# Patient Record
Sex: MALE | Race: WHITE | HISPANIC OR LATINO | Employment: FULL TIME | URBAN - METROPOLITAN AREA
[De-identification: names, ages, dates, MRNs, and addresses within clinical notes are randomized per-mention and may not be internally consistent; named-entity substitution may affect disease eponyms.]

---

## 2021-07-02 ENCOUNTER — OFFICE VISIT (OUTPATIENT)
Dept: FAMILY MEDICINE CLINIC | Facility: CLINIC | Age: 26
End: 2021-07-02

## 2021-07-02 VITALS
BODY MASS INDEX: 29.83 KG/M2 | HEIGHT: 69 IN | SYSTOLIC BLOOD PRESSURE: 162 MMHG | TEMPERATURE: 98.7 F | RESPIRATION RATE: 16 BRPM | HEART RATE: 91 BPM | DIASTOLIC BLOOD PRESSURE: 110 MMHG | WEIGHT: 201.4 LBS | OXYGEN SATURATION: 99 %

## 2021-07-02 DIAGNOSIS — K21.9 GASTROESOPHAGEAL REFLUX DISEASE WITHOUT ESOPHAGITIS: ICD-10-CM

## 2021-07-02 DIAGNOSIS — F41.9 ANXIETY: ICD-10-CM

## 2021-07-02 DIAGNOSIS — Z00.00 ANNUAL PHYSICAL EXAM: Primary | ICD-10-CM

## 2021-07-02 PROCEDURE — 86803 HEPATITIS C AB TEST: CPT | Performed by: FAMILY MEDICINE

## 2021-07-02 PROCEDURE — G0432 EIA HIV-1/HIV-2 SCREEN: HCPCS | Performed by: FAMILY MEDICINE

## 2021-07-02 PROCEDURE — 99385 PREV VISIT NEW AGE 18-39: CPT | Performed by: FAMILY MEDICINE

## 2021-07-02 PROCEDURE — 80053 COMPREHEN METABOLIC PANEL: CPT | Performed by: FAMILY MEDICINE

## 2021-07-02 PROCEDURE — 99213 OFFICE O/P EST LOW 20 MIN: CPT | Performed by: FAMILY MEDICINE

## 2021-07-02 PROCEDURE — 83036 HEMOGLOBIN GLYCOSYLATED A1C: CPT | Performed by: FAMILY MEDICINE

## 2021-07-02 PROCEDURE — 84443 ASSAY THYROID STIM HORMONE: CPT | Performed by: FAMILY MEDICINE

## 2021-07-02 PROCEDURE — 80061 LIPID PANEL: CPT | Performed by: FAMILY MEDICINE

## 2021-07-02 NOTE — PROGRESS NOTES
New Patient Office Visit      Patient Name: Tavo Tomlinson  : 1995   MRN: [id number]     Chief Complaint:    Chief Complaint   Patient presents with   • Establish Care   • Annual Exam       History of Present Illness: Tavo Tomlinson is a 25 y.o. male who is here today to establish care.  Patient presents to establish care and needs annual exam.  Patient is also has complaint of GERD anxiety and he has hypertension today.    Anxiety - he has anxiety related to work, marriage and kids. This may worsened bp.  Patient says overall his anxiety does not affect his life.  Patient says it may affect his blood pressure but he deals with it fairly well.      Elevated BP-patient denies chest pain or shortness of breath but does have reflux symptoms.  Patient noted previously elevated blood pressure at an eye doctor's appointment.  Patient denied having ocular changes related of blood pressure.  Patient says his eye doctor did not mention this.    gerd - he went to ent for gerd and this helps his sx. He says he can still have symptoms. He uses tums. He says it is controlled some.  Patient does not want to take an everyday medication.    Review of systems positive for acid reflux and anxiety    Physical exam: Patient's heart lung exam was normal.  Patient's mood and affect was appropriate.  Patient had no CVA tenderness.  Patient's neurological exam was grossly intact.        Subjective          Past Medical History: History reviewed. No pertinent past medical history.    Past Surgical History: History reviewed. No pertinent surgical history.    Family History:   Family History   Problem Relation Age of Onset   • No Known Problems Mother    • No Known Problems Father    • No Known Problems Brother    • No Known Problems Maternal Grandmother    • Cancer Maternal Grandfather    • Diabetes Paternal Grandmother    • Hyperlipidemia Paternal Grandmother    • Hypertension Paternal Grandmother    • Throat cancer Paternal  "Grandfather    • Stroke Paternal Grandfather    • No Known Problems Son        Social History:   Social History     Socioeconomic History   • Marital status:      Spouse name: Not on file   • Number of children: Not on file   • Years of education: Not on file   • Highest education level: Not on file   Tobacco Use   • Smoking status: Never Smoker   • Smokeless tobacco: Never Used   Substance and Sexual Activity   • Alcohol use: Yes     Comment: socially   • Drug use: Never   • Sexual activity: Yes     Partners: Female     Comment:        Medications:   No current outpatient medications on file.    Allergies:   No Known Allergies    Objective     Physical Exam: Please see HPI for physical exam  Vital Signs:   Vitals:    07/02/21 1332   BP: (!) 162/110   Pulse: 91   Resp: 16   Temp: 98.7 °F (37.1 °C)   TempSrc: Temporal   SpO2: 99%   Weight: 91.4 kg (201 lb 6.4 oz)   Height: 175.3 cm (69\")   PainSc: 0-No pain     Body mass index is 29.74 kg/m².       Assessment / Plan      Assessment/Plan:   Diagnoses and all orders for this visit:    1. Annual physical exam (Primary)  -     Comprehensive Metabolic Panel  -     Hemoglobin A1c  -     TSH Rfx On Abnormal To Free T4  -     Lipid Panel  -     Hepatitis C Antibody  -     HIV-1 / O / 2 Ag / Antibody 4th Generation         Annual counseling: Counseled patient regards to diet exercise and provided him with ample diet information today.  Also counseled him in regards to to screening labs above.  Patient agreed to screening labs.  Patient was educated on vaccine status and he is currently up-to-date except for Covid vaccine.  Patient presents with elevated blood pressure.  He will monitor blood pressure at home and can call in with elevated blood pressures next week.  If elevated I would recommend treating and then following up in 6 weeks for repeat renal function test.  If not responsive to hypertension medications we should work-up for secondary hypertension " given his age and lack of family history.      Follow Up:   No follow-ups on file.    Gordy Tucker DO  [facility] [place]

## 2021-07-03 LAB
ALBUMIN SERPL-MCNC: 4.8 G/DL (ref 3.5–5.2)
ALBUMIN/GLOB SERPL: 1.5 G/DL
ALP SERPL-CCNC: 91 U/L (ref 39–117)
ALT SERPL W P-5'-P-CCNC: 20 U/L (ref 1–41)
ANION GAP SERPL CALCULATED.3IONS-SCNC: 9.9 MMOL/L (ref 5–15)
AST SERPL-CCNC: 24 U/L (ref 1–40)
BILIRUB SERPL-MCNC: 0.5 MG/DL (ref 0–1.2)
BUN SERPL-MCNC: 12 MG/DL (ref 6–20)
BUN/CREAT SERPL: 11.7 (ref 7–25)
CALCIUM SPEC-SCNC: 10 MG/DL (ref 8.6–10.5)
CHLORIDE SERPL-SCNC: 105 MMOL/L (ref 98–107)
CHOLEST SERPL-MCNC: 149 MG/DL (ref 0–200)
CO2 SERPL-SCNC: 26.1 MMOL/L (ref 22–29)
CREAT SERPL-MCNC: 1.03 MG/DL (ref 0.76–1.27)
GFR SERPL CREATININE-BSD FRML MDRD: 89 ML/MIN/1.73
GLOBULIN UR ELPH-MCNC: 3.3 GM/DL
GLUCOSE SERPL-MCNC: 86 MG/DL (ref 65–99)
HBA1C MFR BLD: 4.8 % (ref 4.8–5.6)
HCV AB SER DONR QL: NORMAL
HDLC SERPL-MCNC: 43 MG/DL (ref 40–60)
HIV1+2 AB SER QL: NORMAL
LDLC SERPL CALC-MCNC: 91 MG/DL (ref 0–100)
LDLC/HDLC SERPL: 2.1 {RATIO}
POTASSIUM SERPL-SCNC: 4.7 MMOL/L (ref 3.5–5.2)
PROT SERPL-MCNC: 8.1 G/DL (ref 6–8.5)
SODIUM SERPL-SCNC: 141 MMOL/L (ref 136–145)
TRIGL SERPL-MCNC: 79 MG/DL (ref 0–150)
TSH SERPL DL<=0.05 MIU/L-ACNC: 3.44 UIU/ML (ref 0.27–4.2)
VLDLC SERPL-MCNC: 15 MG/DL (ref 5–40)

## 2021-07-06 ENCOUNTER — TELEPHONE (OUTPATIENT)
Dept: FAMILY MEDICINE CLINIC | Facility: CLINIC | Age: 26
End: 2021-07-06

## 2021-07-06 NOTE — TELEPHONE ENCOUNTER
Caller: Tavo Garcia    Relationship: Self    Best call back number: [phone]    What test was performed: LABS     When was the test performed: 7/2/21    Where was the test performed: IN OFFICE     Additional notes: PATIENT STATED THAT ALL THE MARKERS WERE NORMAL.  IS THERE ANYTHING DR. FERREIRA NEEDS TO GO OVER WITH HIM?

## 2021-07-09 ENCOUNTER — TELEPHONE (OUTPATIENT)
Dept: FAMILY MEDICINE CLINIC | Facility: CLINIC | Age: 26
End: 2021-07-09

## 2021-07-09 NOTE — TELEPHONE ENCOUNTER
Notified pt, pt verbally understood. Pt does not have an upcoming appointment. Pt is not sure if he should have scheduled, at last visit pt stated that you said it would be based off how his BP was reading. Do pt need an follow up appointment, please advise.

## 2021-07-09 NOTE — TELEPHONE ENCOUNTER
PATIENT HAS CALLED WITH HIS LAST 3 BLOOD PRESSURE READINGS PER PCP REQUEST    7/6/21    7:16AM   121/80     10PM   132/79    7/7/21     8:39AM   122/75      9:15 /87    7/8/21     7:50AM   122/84       10:15 PM  126/76      7/9         9 AM  125/85      CALL BACK NUMBER [phone]

## 2021-09-17 DIAGNOSIS — Z30.09 VASECTOMY EVALUATION: Primary | ICD-10-CM

## 2021-11-23 ENCOUNTER — OFFICE VISIT (OUTPATIENT)
Dept: UROLOGY | Facility: CLINIC | Age: 26
End: 2021-11-23

## 2021-11-23 VITALS
BODY MASS INDEX: 27.73 KG/M2 | HEIGHT: 69 IN | DIASTOLIC BLOOD PRESSURE: 76 MMHG | SYSTOLIC BLOOD PRESSURE: 142 MMHG | TEMPERATURE: 97.6 F | OXYGEN SATURATION: 97 % | HEART RATE: 88 BPM | WEIGHT: 187.2 LBS

## 2021-11-23 DIAGNOSIS — Z30.09 VASECTOMY EVALUATION: Primary | ICD-10-CM

## 2021-11-23 LAB
BILIRUB BLD-MCNC: NEGATIVE MG/DL
CLARITY, POC: CLEAR
COLOR UR: YELLOW
EXPIRATION DATE: NORMAL
GLUCOSE UR STRIP-MCNC: NEGATIVE MG/DL
KETONES UR QL: NEGATIVE
LEUKOCYTE EST, POC: NEGATIVE
Lab: NORMAL
NITRITE UR-MCNC: NEGATIVE MG/ML
PH UR: 6 [PH] (ref 5–8)
PROT UR STRIP-MCNC: NEGATIVE MG/DL
RBC # UR STRIP: NEGATIVE /UL
SP GR UR: 1.02 (ref 1–1.03)
UROBILINOGEN UR QL: NORMAL

## 2021-11-23 PROCEDURE — 99203 OFFICE O/P NEW LOW 30 MIN: CPT | Performed by: STUDENT IN AN ORGANIZED HEALTH CARE EDUCATION/TRAINING PROGRAM

## 2021-11-23 PROCEDURE — 81003 URINALYSIS AUTO W/O SCOPE: CPT | Performed by: STUDENT IN AN ORGANIZED HEALTH CARE EDUCATION/TRAINING PROGRAM

## 2021-11-23 RX ORDER — DIAZEPAM 10 MG/1
10 TABLET ORAL ONCE
Qty: 1 TABLET | Refills: 0 | Status: SHIPPED | OUTPATIENT
Start: 2021-11-23 | End: 2021-11-23

## 2021-11-23 NOTE — PROGRESS NOTES
Office Note Vasectomy Consult     Patient Name: Tavo Garcia  : 1995   MRN: [id number]     Chief Complaint:  Elective Sterilization.   Chief Complaint   Patient presents with   • vasectomy consult       Referring Provider: Gordy Tucker DO    History of Present Illness: Tavo Garcia is a 26 y.o. male who presents to Urology today with the desire for irreversible, elective sterilization.      He has 2 biological children children with his wife, they have been discussing vasectomy for a number of years and had only ever wanted to have 2 children.    His and his wife have discussed this decision at length and both would like to proceed with elective sterilization.    Patient denies history of prior scrotal surgery, denies significant history of scrotal injury.  Patient does report some baseline left-sided testicular pain with heavy activity.  He was counseled that vasectomy could make this pain worse or chronic.  He denies history of bleeding disorders.  Denies anesthetic related complications.    Subjective      Review of Systems: Review of Systems   Constitutional: Negative for chills, fatigue, fever and unexpected weight change.   HENT: Negative for sore throat.    Eyes: Negative for visual disturbance.   Respiratory: Negative for cough, chest tightness and shortness of breath.    Cardiovascular: Negative for chest pain and leg swelling.   Gastrointestinal: Negative for blood in stool, constipation, diarrhea, nausea, rectal pain and vomiting.   Genitourinary: Negative for decreased urine volume, difficulty urinating, dysuria, enuresis, flank pain, frequency, genital sores, hematuria and urgency.   Musculoskeletal: Negative for back pain and joint swelling.   Skin: Negative for rash and wound.   Neurological: Negative for seizures, speech difficulty, weakness and headaches.   Psychiatric/Behavioral: Negative for confusion, sleep disturbance and suicidal ideas. The patient is not nervous/anxious.      "  I have reviewed the ROS documented by my clinical staff, I have updated appropriately and I agree. Navdeep Banuelos MD    Past Medical History: History reviewed. No pertinent past medical history.    Past Surgical History: History reviewed. No pertinent surgical history.    Family History:   Family History   Problem Relation Age of Onset   • No Known Problems Mother    • No Known Problems Father    • No Known Problems Brother    • No Known Problems Maternal Grandmother    • Cancer Maternal Grandfather    • Diabetes Paternal Grandmother    • Hyperlipidemia Paternal Grandmother    • Hypertension Paternal Grandmother    • Throat cancer Paternal Grandfather    • Stroke Paternal Grandfather    • No Known Problems Son        Social History:   Social History     Socioeconomic History   • Marital status:    Tobacco Use   • Smoking status: Never Smoker   • Smokeless tobacco: Never Used   Vaping Use   • Vaping Use: Former   • Substances: Nicotine   • Devices: Pre-filled pod   Substance and Sexual Activity   • Alcohol use: Yes     Comment: socially   • Drug use: Never   • Sexual activity: Yes     Partners: Female     Comment:        Medications:     Current Outpatient Medications:   •  diazePAM (Valium) 10 MG tablet, Take 1 tablet by mouth 1 (One) Time for 1 dose. Take 60 mins prior to vasectomy, Disp: 1 tablet, Rfl: 0    Allergies:   No Known Allergies       Objective     Physical Exam:   Vital Signs:   Vitals:    11/23/21 1050   BP: 142/76   Pulse: 88   Temp: 97.6 °F (36.4 °C)   TempSrc: Temporal   SpO2: 97%   Weight: 84.9 kg (187 lb 3.2 oz)   Height: 175.3 cm (69\")     Body mass index is 27.64 kg/m².     Physical Exam  Vitals and nursing note reviewed.   Constitutional:       Appearance: Normal appearance.   HENT:      Head: Normocephalic and atraumatic.      Mouth/Throat:      Mouth: Mucous membranes are moist.      Pharynx: Oropharynx is clear.   Eyes:      Extraocular Movements: Extraocular movements " intact.      Conjunctiva/sclera: Conjunctivae normal.   Cardiovascular:      Rate and Rhythm: Normal rate and regular rhythm.   Pulmonary:      Effort: Pulmonary effort is normal. No respiratory distress.   Abdominal:      Palpations: Abdomen is soft.      Tenderness: There is no abdominal tenderness. There is no right CVA tenderness or left CVA tenderness.   Genitourinary:     Comments:  Circumcised phallus, orthotopic meatus, bilaterally descended testicles without masses, or lesions. Vas deferens palpable bilaterally     Musculoskeletal:         General: Normal range of motion.      Cervical back: Normal range of motion.   Skin:     General: Skin is warm and dry.   Neurological:      General: No focal deficit present.      Mental Status: He is alert and oriented to person, place, and time.   Psychiatric:         Mood and Affect: Mood normal.         Behavior: Behavior normal.         Labs:   Brief Urine Lab Results     None               Lab Results   Component Value Date    GLUCOSE 86 07/02/2021    CALCIUM 10.0 07/02/2021     07/02/2021    K 4.7 07/02/2021    CO2 26.1 07/02/2021     07/02/2021    BUN 12 07/02/2021    CREATININE 1.03 07/02/2021    EGFRIFNONA 89 07/02/2021    BCR 11.7 07/02/2021    ANIONGAP 9.9 07/02/2021       No results found for: WBC, HGB, HCT, MCV, PLT    Images:   No Images in the past 120 days found..    Measures:   Tobacco:   Tavo Garcia  reports that he has never smoked. He has never used smokeless tobacco.    Assessment / Plan      Assessment/Plan:   Mr. Garcia is a 26 y.o. male who presented to clinic today for irreversible elective sterilization.    Diagnoses and all orders for this visit:    1. Vasectomy evaluation (Primary)  -     diazePAM (Valium) 10 MG tablet; Take 1 tablet by mouth 1 (One) Time for 1 dose. Take 60 mins prior to vasectomy  Dispense: 1 tablet; Refill: 0         Patient Education:   The patient has requested a vasectomy for elective sterilization and  the risks and benefits of the procedure were explained at length. The procedure itself was explained and postop followup explained at this point. The patient was given a prescription for Valium 10 mg to be taken 30 minutes prior to the procedure.  We discussed he will need a  to take him home. I extensively reviewed with him the likely postoperative recuperative period as well as the need to continue to use contraception until he is notified by me of his sterility.     I discussed with the patient that I am able to perform this procedure in the urology clinic under a local anesthetic, and also offer the procedure to be performed at the outpatient surgery center under light anesthetic if that would be the desire of the patient.  I discussed that in clinic procedure is likely significantly cheaper than performing this at an outpatient surgery center.  I counseled the patient to speak to the outpatient surgery center billing department and with his insurance company prior to determine out-of-pocket costs if this is something he would like to consider.  When I perform this procedure in the clinic, I typically offer a 10 mg tablet of Valium 45 to 60 minutes prior to the vasectomy for anxiolysis.    He will undergo a semen analysis 3 months post-procedure AND 20 ejaculations before his first semen analysis check. He understands the potential side effects of anesthesia, bleeding, scrotal hematoma, wound infection, epididymo-orchitis, epididymal congestion, chronic testicular pain requiring further intervention/surgery, sperm granuloma, recanalization and risk of sperm return and/or pregnancy  (1 in 2000 as per the AUA guidelines).     Patient understands and would like to proceed with vasectomy performed in clinic.     Follow Up:   Return in about 6 weeks (around 1/4/2022) for Jan 4th Tuesday vasectomy in clinic.    I spent approximately 30 minutes providing clinical care for this patient; including review of  patient's chart and provider documentation, face to face time spent with patient in examination room (obtaining history, performing physical exam, discussing diagnosis and management options), placing orders, and completing patient documentation.     Navdeep Banuelos MD  [facility] [place]

## 2022-01-11 ENCOUNTER — TELEPHONE (OUTPATIENT)
Dept: FAMILY MEDICINE CLINIC | Facility: CLINIC | Age: 27
End: 2022-01-11

## 2022-01-11 NOTE — TELEPHONE ENCOUNTER
Caller: Tavo Garcia    Relationship: Self    Best call back number: [phone]    What test was performed: CAT SCAN     When was the test performed: 01/07/2022    Where was the test performed: [facility]    Additional notes: PATIENT IS CALLING TO SEE IF THE OFFICE HAS RECEIVED THESE RESULTS

## 2022-01-11 NOTE — TELEPHONE ENCOUNTER
Pt notified via [employer] that we have not received any outside records as of right now

## 2022-01-21 ENCOUNTER — TELEPHONE (OUTPATIENT)
Dept: FAMILY MEDICINE CLINIC | Facility: CLINIC | Age: 27
End: 2022-01-21

## 2022-01-21 NOTE — TELEPHONE ENCOUNTER
Caller: Tavo Garcia    Relationship: Self    Best call back number: [phone]    What is the medical concern/diagnosis: HAD A  CAT SCAN/MRI WITH CONTRAST THAT DID NOT MAKE IT  INTO LIVER THE DOCTORS WERE CONCERNED ABOUT THAT    What specialty or service is being requested: A REFERAL TO A HEPATOLOGIST    What is the provider, practice or medical service name: REQUESTING A DOCTOR CLOSE TO [facility]    Any additional details: SON HAD A LIVER TRANSPLANT, PATIENT WAS BEING TESTED TO BE A LIVE DONOR WHEN IT WAS DISCOVERED THAT THE CONRAST DID NOT MAKE IT TO HIS LIVER AND IT WAS IN HIS BILIARY DUCT.

## 2022-01-28 DIAGNOSIS — R93.5 ABNORMAL ABDOMINAL MRI: Primary | ICD-10-CM

## 2022-01-28 DIAGNOSIS — K83.8 DILATED BILE DUCT: ICD-10-CM

## 2022-02-02 ENCOUNTER — LAB (OUTPATIENT)
Dept: LAB | Facility: HOSPITAL | Age: 27
End: 2022-02-02

## 2022-02-02 ENCOUNTER — OFFICE VISIT (OUTPATIENT)
Dept: GASTROENTEROLOGY | Facility: CLINIC | Age: 27
End: 2022-02-02

## 2022-02-02 VITALS
SYSTOLIC BLOOD PRESSURE: 122 MMHG | HEIGHT: 69 IN | BODY MASS INDEX: 28.29 KG/M2 | DIASTOLIC BLOOD PRESSURE: 82 MMHG | TEMPERATURE: 98.2 F | WEIGHT: 191 LBS

## 2022-02-02 DIAGNOSIS — Q44.1 HEPATOBILIARY ANOMALY: ICD-10-CM

## 2022-02-02 DIAGNOSIS — Q44.7: ICD-10-CM

## 2022-02-02 DIAGNOSIS — Q44.7 HEPATOBILIARY ANOMALY: ICD-10-CM

## 2022-02-02 DIAGNOSIS — Q44.1: Primary | ICD-10-CM

## 2022-02-02 DIAGNOSIS — K21.9 GASTROESOPHAGEAL REFLUX DISEASE WITHOUT ESOPHAGITIS: ICD-10-CM

## 2022-02-02 DIAGNOSIS — Q44.5 HEPATOBILIARY ANOMALY: ICD-10-CM

## 2022-02-02 DIAGNOSIS — Q44.7: Primary | ICD-10-CM

## 2022-02-02 DIAGNOSIS — Q79.0: ICD-10-CM

## 2022-02-02 DIAGNOSIS — Q44.5: Primary | ICD-10-CM

## 2022-02-02 DIAGNOSIS — Q44.1: ICD-10-CM

## 2022-02-02 DIAGNOSIS — Q44.5: ICD-10-CM

## 2022-02-02 LAB — ALPHA-FETOPROTEIN: 1.4 NG/ML (ref 0–8.3)

## 2022-02-02 PROCEDURE — 82105 ALPHA-FETOPROTEIN SERUM: CPT

## 2022-02-02 PROCEDURE — 36415 COLL VENOUS BLD VENIPUNCTURE: CPT

## 2022-02-02 PROCEDURE — 99204 OFFICE O/P NEW MOD 45 MIN: CPT | Performed by: INTERNAL MEDICINE

## 2022-02-02 PROCEDURE — 86301 IMMUNOASSAY TUMOR CA 19-9: CPT

## 2022-02-02 NOTE — PROGRESS NOTES
New Patient Consult      Date: 2022   Patient Name: Tavo Garcia  MRN: [id number]  : 1995     Referring Physician: Gordy Tucker DO    Chief Complaint   Patient presents with   • Abnormal MRI       History of Present Illness: Tavo Garcia is a 26 y.o. male who is here today to establish care with Gastroenterology for evaluation of abnormal imaging studies. His son born with bilary atresia and he is 6 month old. He did not get liver for transplant until six month ago. He was trying to work up for living donor candidate for liver transplant to his son.  To his surprise his son got OLT week after he had work-up done with MRI/ CT imaging at [facility] in 2022.     He had a CT abdomen pelvis done on 2022 which revealed mild intra and extrahepatic biliary dilatation and hepatic artery anomalous vascular branches.  He subsequently had an MRI scan with and without contrast same day confirming the same findings.  It also showed a small renal cyst and a small congenital diaphragmatic hernia.  He also had a bunch of lab work done during that visit.    He has been having intermittent reflux symptoms, took PPI for one yr and stopped . This patient deny any abdominal pain, change in bowel habit, hematochezia or melena.  Weight is stable. Pt denies nausea vomiting or odynophagia or dysphagia. There is no history of anemia. No prior history of EGD or colonoscopy. No family history of colon cancer or any GI malignancy. Gnadfatehr had cirrhosis, Grandmother had fatty liver. No history of any abdominal surgery. Denies cigarette smoking.   Drinks 5-10 drinks per week including beer, wine, burboun    Subjective      Past Medical History: History reviewed. No pertinent past medical history.    Past Surgical History: No past surgical history on file.    Family History:   Family History   Problem Relation Age of Onset   • No Known Problems Mother    • No Known Problems Father    • No  "Known Problems Brother    • No Known Problems Maternal Grandmother    • Cancer Maternal Grandfather    • Diabetes Paternal Grandmother    • Hyperlipidemia Paternal Grandmother    • Hypertension Paternal Grandmother    • Throat cancer Paternal Grandfather    • Stroke Paternal Grandfather    • No Known Problems Son        Social History:   Social History     Socioeconomic History   • Marital status:    Tobacco Use   • Smoking status: Never Smoker   • Smokeless tobacco: Never Used   Vaping Use   • Vaping Use: Former   • Substances: Nicotine   • Devices: Pre-filled pod   Substance and Sexual Activity   • Alcohol use: Yes     Comment: socially   • Drug use: Never   • Sexual activity: Yes     Partners: Female     Comment:        No current outpatient medications on file.    No Known Allergies    Review of Systems:   Review of Systems   Constitutional: Negative for appetite change, fatigue, fever and unexpected weight loss.   HENT: Negative for trouble swallowing.    Gastrointestinal: Negative for abdominal distention, abdominal pain, anal bleeding, blood in stool, constipation, diarrhea, nausea, rectal pain, vomiting, GERD and indigestion.       The following portions of the patient's history were reviewed and updated as appropriate: allergies, current medications, past family history, past medical history, past social history, past surgical history and problem list.    Objective     Physical Exam:  Vital Signs:   Vitals:    02/02/22 1309   BP: 122/82   Temp: 98.2 °F (36.8 °C)   TempSrc: Infrared   Weight: 86.6 kg (191 lb)   Height: 175.3 cm (69\")       Physical Exam  Constitutional:       Appearance: Normal appearance.   HENT:      Head: Normocephalic and atraumatic.   Eyes:      Conjunctiva/sclera: Conjunctivae normal.   Abdominal:      General: Abdomen is flat. There is no distension.      Palpations: There is no mass.      Tenderness: There is no abdominal tenderness. There is no guarding or rebound.     "  Hernia: No hernia is present.   Musculoskeletal:      Cervical back: Normal range of motion and neck supple.   Neurological:      Mental Status: He is alert.           Results Review:   I have reviewed the patient's new clinical and imaging results and agree with the interpretation.     Office Visit on 11/23/2021   Component Date Value Ref Range Status   • Color 11/23/2021 Yellow  Yellow, Straw, Dark Yellow, Stephanie Final   • Clarity, UA 11/23/2021 Clear  Clear Final   • Specific Gravity  11/23/2021 1.020  1.005 - 1.030 Final   • pH, Urine 11/23/2021 6.0  5.0 - 8.0 Final   • Leukocytes 11/23/2021 Negative  Negative Final   • Nitrite, UA 11/23/2021 Negative  Negative Final   • Protein, POC 11/23/2021 Negative  Negative mg/dL Final   • Glucose, UA 11/23/2021 Negative  Negative, 1000 mg/dL (3+) mg/dL Final   • Ketones, UA 11/23/2021 Negative  Negative Final   • Urobilinogen, UA 11/23/2021 Normal  Normal Final   • Bilirubin 11/23/2021 Negative  Negative Final   • Blood, UA 11/23/2021 Negative  Negative Final   • Lot Number 11/23/2021 [id number]   Final   • Expiration Date 11/23/2021 02/28/22   Final      No radiology results for the last 90 days.    Assessment / Plan      Assessment & Plan:  1. Congenital anomalies of gallbladder, bile ducts, and liver  2. Hepatobiliary anomaly  3.  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  He was 6 months old.  Patient was trying to work-up for living donor candidate for liver transplant to his son.  Surprisingly his son got liver transplant week after he was started working up with imaging and lab work at [facility] in January 2022.  His imaging studies done including CT abdomen pelvis with contrast and MRI scan with and without contrast showed a significant abnormalities involving the hepatobiliary system for which he was referred here.    His CT scan abdomen  pelvis done with contrast on 1/7/2022 revealed normal-looking liver parenchyma.  There was a prominent mild dilation of the intra and extrahepatic biliary tree.  Apparent hepatic artery anomaly was noted.  The right and left hepatic arteries arising from the common hepatic artery appeared relatively small while there are large accessory hepatic arteries arising from the left gastric artery and superior mesenteric artery respectively.  CT scan done also revealed 1.3 cm nonenhancing lesion in the right kidney consistent with a benign cyst.  CT scan also showed a mild pancreatic atrophy and a small fat-containing diaphragmatic hernia.    Patient subsequently had MRI scan with and without contrast same day which again revealed mildly dilated CBD measuring up to 6 to 7 mm.  There was mild diffuse intrahepatic biliary dilatation.  There was an area of apparent bile duct narrowing at the junction of the right and left hepatic branches likely due to vascular compression.  There was also apparent narrowing of the very distal common bile duct near the ampulla Vater.  There was an apparent right posterior branch draining into the central left hepatic duct.  There was no excretion of the vertebrae specific contrast agent through the biliary system at least for 20 minutes after administration adjusting slow biliary drainage..  Contrast material excreted by the kidneys.    He is a CBC CMP done in July 2021 and January 2022 were unremarkable with a normal AST ALT alkaline phosphatase total bilirubin.  A1 AT 82/MZ phenotype.  NANDINI, anti-smooth muscle antibody, NANDINI negative.  Ceruloplasmin level normal.  CMV EBV IgM negative.  CMV IgG positive.  PT/INR normal.  A1c normal.  Anti-LK M antibodies negative.  Lipase normal.  GGT normal.  Sed rate is normal.  Urine microalbumin negative.  Hemochromatosis gene mutation absent.  Anticardiolipin antibodies negative.  Lipase and amylase were within normal limit.  Factor V Leyden was negative.   All these test reports are from January 2022.     Patient is asymptomatic.  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  His liver enzymes are all normal without any signs of obstruction.  Patient may need a ERCP to better evaluate the distal CBD narrowing.  This patient needs a multidisciplinary meeting recommendation at liver unit.     We will add a CA 19-9, AFP  We will refer him to liver unit to Dr. Boo  Advised to cut down alcohol consumption and avoid smoking or vaping  We will follow-up in 6-month after hepatology visit    4. Congenital diaphragmatic hernia of foramen of [place]  5. Gastroesophageal reflux disease without esophagitis  His CT scan abdomen pelvis done with contrast revealed a possible congenital diaphragmatic hernia with fat is content.   We will get barium small bowel follow-through to rule out any gastric, small bowel or large bowel herniation    He was on a PPI for a couple of years now stopped.  Rarely gets reflux symptoms. . Reflux diet discussed with the patient.  Advised to take PPI over-the-counter as needed  May need a EGD in the near future        Follow Up:   No follow-ups on file.    Giovanny Bruno MD  [facility]  2/2/2022  13:11 EST    Please note that portions of this note may have been completed with a voice recognition program.

## 2022-02-02 NOTE — PATIENT INSTRUCTIONS

## 2022-02-03 DIAGNOSIS — Q44.1 HEPATOBILIARY ANOMALY: Primary | ICD-10-CM

## 2022-02-03 DIAGNOSIS — Q44.7 HEPATOBILIARY ANOMALY: Primary | ICD-10-CM

## 2022-02-03 DIAGNOSIS — Q44.5 HEPATOBILIARY ANOMALY: Primary | ICD-10-CM

## 2022-02-03 LAB — CANCER AG19-9 SERPL-ACNC: 12.7 U/ML

## 2022-02-04 ENCOUNTER — PATIENT MESSAGE (OUTPATIENT)
Dept: FAMILY MEDICINE CLINIC | Facility: CLINIC | Age: 27
End: 2022-02-04

## 2022-02-04 ENCOUNTER — TELEPHONE (OUTPATIENT)
Dept: FAMILY MEDICINE CLINIC | Facility: CLINIC | Age: 27
End: 2022-02-04

## 2022-02-04 ENCOUNTER — APPOINTMENT (OUTPATIENT)
Dept: ULTRASOUND IMAGING | Facility: HOSPITAL | Age: 27
End: 2022-02-04

## 2022-02-04 NOTE — TELEPHONE ENCOUNTER
----- Message from Tavo Garcia sent at 2/4/2022 10:20 AM EST -----  Regarding: Ultrasound  Hey there Dr. Tucker,    I had the ultrasound originally scheduled for today, however, because of the weather I had to change the date.    Dr. Baltazar has referred me to a hepatologist at  to follow up with them. So my question would be, is the ultrasound still necessary if I am going to go see a specialist? I have the appointment date set to March.     Thanks,    Rich

## 2022-02-17 ENCOUNTER — HOSPITAL ENCOUNTER (OUTPATIENT)
Dept: ULTRASOUND IMAGING | Facility: HOSPITAL | Age: 27
Discharge: HOME | End: 2022-02-17
Admitting: FAMILY MEDICINE

## 2022-02-17 ENCOUNTER — APPOINTMENT (OUTPATIENT)
Dept: GENERAL RADIOLOGY | Facility: HOSPITAL | Age: 27
End: 2022-02-17

## 2022-02-17 DIAGNOSIS — K83.8 DILATED BILE DUCT: ICD-10-CM

## 2022-02-17 DIAGNOSIS — R93.5 ABNORMAL ABDOMINAL MRI: ICD-10-CM

## 2022-02-17 PROCEDURE — 76705 ECHO EXAM OF ABDOMEN: CPT

## 2022-05-13 DIAGNOSIS — K83.8 DILATED BILE DUCT: ICD-10-CM

## 2022-05-13 DIAGNOSIS — R93.5 ABNORMAL ABDOMINAL MRI: Primary | ICD-10-CM

## 2023-11-08 ENCOUNTER — LAB (OUTPATIENT)
Dept: LAB | Facility: HOSPITAL | Age: 28
End: 2023-11-08
Payer: COMMERCIAL

## 2023-11-08 ENCOUNTER — OFFICE VISIT (OUTPATIENT)
Dept: FAMILY MEDICINE CLINIC | Facility: CLINIC | Age: 28
End: 2023-11-08
Payer: COMMERCIAL

## 2023-11-08 VITALS
TEMPERATURE: 99.3 F | BODY MASS INDEX: 24.68 KG/M2 | HEIGHT: 69 IN | OXYGEN SATURATION: 100 % | WEIGHT: 166.6 LBS | HEART RATE: 88 BPM | SYSTOLIC BLOOD PRESSURE: 128 MMHG | DIASTOLIC BLOOD PRESSURE: 66 MMHG

## 2023-11-08 DIAGNOSIS — Z00.00 ANNUAL PHYSICAL EXAM: ICD-10-CM

## 2023-11-08 DIAGNOSIS — Z00.00 ANNUAL PHYSICAL EXAM: Primary | ICD-10-CM

## 2023-11-08 LAB
ALBUMIN SERPL-MCNC: 4.9 G/DL (ref 3.5–5.2)
ALBUMIN/GLOB SERPL: 1.8 G/DL
ALP SERPL-CCNC: 94 U/L (ref 39–117)
ALT SERPL W P-5'-P-CCNC: 17 U/L (ref 1–41)
ANION GAP SERPL CALCULATED.3IONS-SCNC: 11 MMOL/L (ref 5–15)
AST SERPL-CCNC: 23 U/L (ref 1–40)
BILIRUB SERPL-MCNC: 0.6 MG/DL (ref 0–1.2)
BUN SERPL-MCNC: 17 MG/DL (ref 6–20)
BUN/CREAT SERPL: 14.3 (ref 7–25)
CALCIUM SPEC-SCNC: 9.7 MG/DL (ref 8.6–10.5)
CHLORIDE SERPL-SCNC: 103 MMOL/L (ref 98–107)
CO2 SERPL-SCNC: 25 MMOL/L (ref 22–29)
CREAT SERPL-MCNC: 1.19 MG/DL (ref 0.76–1.27)
EGFRCR SERPLBLD CKD-EPI 2021: 85.9 ML/MIN/1.73
GLOBULIN UR ELPH-MCNC: 2.7 GM/DL
GLUCOSE SERPL-MCNC: 81 MG/DL (ref 65–99)
POTASSIUM SERPL-SCNC: 4.1 MMOL/L (ref 3.5–5.2)
PROT SERPL-MCNC: 7.6 G/DL (ref 6–8.5)
SODIUM SERPL-SCNC: 139 MMOL/L (ref 136–145)

## 2023-11-08 PROCEDURE — 99395 PREV VISIT EST AGE 18-39: CPT | Performed by: FAMILY MEDICINE

## 2023-11-08 PROCEDURE — 80053 COMPREHEN METABOLIC PANEL: CPT

## 2023-11-08 NOTE — PROGRESS NOTES
"     Follow Up Office Visit      Patient Name: Tavo Garcia  : 1995   MRN: [id number]     Chief Complaint:    Chief Complaint   Patient presents with    Annual Exam     Creatinine was abnormal last time. Wants labs drawn. Has PSC liver disease.       History of Present Illness: Tavo Garcia is a 28 y.o. male who is here today to follow up with elevated creatinine, elevated bp, and he has PSC. Follows with GI.     Here for annual exam. Goes to the gym. Goes to dentist. Feels good about diet. Bp at home has been 130/85. Has been lower. Has taken about half of month.     Overall he reports that PSC is doing well he is asymptomatic.  Following with a GI doctor.  His son had biliary atresia and is doing well after liver transplant.  Patient reports that he works out regularly.  Feels good about his diet.  He is going to the dentist.      Physical exam: Mood and affect appropriate neurological exam grossly intact.  PERRLA.  Neck supple.  Heart exam RRR.  Lung exam CTA bilaterally      Subjective        I have reviewed and the following portions of the patient's history were updated as appropriate: past family history, past medical history, past social history, past surgical history and problem list.    Medications:   No current outpatient medications on file.    Allergies:   No Known Allergies    Objective     Physical Exam: Please see above  Vital Signs:   Vitals:    23 1435   BP: 128/66   Pulse: 88   Temp: 99.3 °F (37.4 °C)   SpO2: 100%   Weight: 75.6 kg (166 lb 9.6 oz)   Height: 175.3 cm (69\")     Body mass index is 24.6 kg/m².          Assessment / Plan      Assessment/Plan:   Diagnoses and all orders for this visit:    1. Annual physical exam (Primary)  -     Comprehensive Metabolic Panel; Future    Annual exam counseling: Counseled patient regards to diet, exercise, dental care and vision care.  Recommend 150 minutes of moderate distress weekly.  Recommend incorporating resistance training into " routine.  Patient should follow-up as needed.  Recommend flu shot patient deferred.    We will monitor kidney function going forward.  If abnormal may have patient follow-up sooner than later.  Follow-up yearly otherwise    Follow Up:   Return in about 1 year (around 11/8/2024) for Recheck, Labs today.    Gordy Tucker DO  [facility] [place] [facility] [place]

## 2025-05-21 ENCOUNTER — OFFICE VISIT (OUTPATIENT)
Dept: FAMILY MEDICINE CLINIC | Facility: CLINIC | Age: 30
End: 2025-05-21
Payer: COMMERCIAL

## 2025-05-21 ENCOUNTER — LAB (OUTPATIENT)
Dept: LAB | Facility: HOSPITAL | Age: 30
End: 2025-05-21
Payer: COMMERCIAL

## 2025-05-21 VITALS
OXYGEN SATURATION: 98 % | BODY MASS INDEX: 25.64 KG/M2 | TEMPERATURE: 98.6 F | WEIGHT: 169.2 LBS | HEART RATE: 72 BPM | HEIGHT: 68 IN | DIASTOLIC BLOOD PRESSURE: 84 MMHG | SYSTOLIC BLOOD PRESSURE: 136 MMHG

## 2025-05-21 DIAGNOSIS — Z00.00 ANNUAL PHYSICAL EXAM: Primary | ICD-10-CM

## 2025-05-21 DIAGNOSIS — Z00.00 ANNUAL PHYSICAL EXAM: ICD-10-CM

## 2025-05-21 PROCEDURE — 80053 COMPREHEN METABOLIC PANEL: CPT

## 2025-05-21 PROCEDURE — 80061 LIPID PANEL: CPT

## 2025-05-21 PROCEDURE — 99395 PREV VISIT EST AGE 18-39: CPT | Performed by: FAMILY MEDICINE

## 2025-05-21 NOTE — PROGRESS NOTES
"     Follow Up Office Visit      Patient Name: Tavo Garcia  : 1995   MRN: [id number]     Chief Complaint:    Chief Complaint   Patient presents with    Annual Exam       History of Present Illness: Tavo Garcia is a 29 y.o. male who is here today to follow up with bilateral knee pain and popping.  Patient says that has been chronic issue but has been causing some discomfort at times with squats.    Here for his annual exam.  Patient's annual exam we discussed diet, exercise and dental care.  Reviewed vaccines and need for early cancer screening.  Patient does not need any other cancer screenings.  No early heart disease.  He is up-to-date with vaccines and dental care.  He has a good exercise routine.  Encouraged him to eat a healthy diet as far as vegetables and whole grains      Physical exam: Patient's mood and affect was appropriate.  Heart exam RRR.  Lungs CTA bilateral.  No lymphadenopathy in the cervical region.  Thyroid midline normal.      Subjective        I have reviewed and the following portions of the patient's history were updated as appropriate: past family history, past medical history, past social history, past surgical history and problem list.    Medications:   No current outpatient medications on file.    Allergies:   No Known Allergies    Objective     Physical Exam: Please see above  Vital Signs:   Vitals:    25 1329   BP: 136/84   BP Location: Left arm   Patient Position: Sitting   Cuff Size: Adult   Pulse: 72   Temp: 98.6 °F (37 °C)   TempSrc: Infrared   SpO2: 98%   Weight: 76.7 kg (169 lb 3.2 oz)   Height: 172.7 cm (68\")     Body mass index is 25.73 kg/m².          Assessment / Plan      Assessment/Plan:   Diagnoses and all orders for this visit:    1. Annual physical exam (Primary)  -     Comprehensive Metabolic Panel; Future  -     Lipid Panel; Future    Annual exam counseling: Counseled regards to diet, vaccines, dental care and vision care.  Encouraged him to stay " today with dental care and vision care.  Encouraged him to keep up with a good diet and exercise routine.  Patient updated vaccines.  Patient updated dental care    Follow Up:   Return in about 1 year (around 5/21/2026) for Labs today, Annual.    Gordy Tucker DO  [facility] [place] [facility] [place]

## 2025-05-22 ENCOUNTER — RESULTS FOLLOW-UP (OUTPATIENT)
Dept: FAMILY MEDICINE CLINIC | Facility: CLINIC | Age: 30
End: 2025-05-22
Payer: COMMERCIAL

## 2025-05-22 LAB
ALBUMIN SERPL-MCNC: 4.7 G/DL (ref 3.5–5.2)
ALBUMIN/GLOB SERPL: 1.4 G/DL
ALP SERPL-CCNC: 78 U/L (ref 39–117)
ALT SERPL W P-5'-P-CCNC: 25 U/L (ref 1–41)
ANION GAP SERPL CALCULATED.3IONS-SCNC: 16 MMOL/L (ref 5–15)
AST SERPL-CCNC: 29 U/L (ref 1–40)
BILIRUB SERPL-MCNC: 0.6 MG/DL (ref 0–1.2)
BUN SERPL-MCNC: 14 MG/DL (ref 6–20)
BUN/CREAT SERPL: 13.2 (ref 7–25)
CALCIUM SPEC-SCNC: 9.7 MG/DL (ref 8.6–10.5)
CHLORIDE SERPL-SCNC: 104 MMOL/L (ref 98–107)
CHOLEST SERPL-MCNC: 168 MG/DL (ref 0–200)
CO2 SERPL-SCNC: 22 MMOL/L (ref 22–29)
CREAT SERPL-MCNC: 1.06 MG/DL (ref 0.76–1.27)
EGFRCR SERPLBLD CKD-EPI 2021: 98 ML/MIN/1.73
GLOBULIN UR ELPH-MCNC: 3.4 GM/DL
GLUCOSE SERPL-MCNC: 77 MG/DL (ref 65–99)
HDLC SERPL-MCNC: 50 MG/DL (ref 40–60)
LDLC SERPL CALC-MCNC: 107 MG/DL (ref 0–100)
LDLC/HDLC SERPL: 2.14 {RATIO}
POTASSIUM SERPL-SCNC: 3.7 MMOL/L (ref 3.5–5.2)
PROT SERPL-MCNC: 8.1 G/DL (ref 6–8.5)
SODIUM SERPL-SCNC: 142 MMOL/L (ref 136–145)
TRIGL SERPL-MCNC: 55 MG/DL (ref 0–150)
VLDLC SERPL-MCNC: 11 MG/DL (ref 5–40)